# Patient Record
Sex: FEMALE | Race: WHITE | URBAN - METROPOLITAN AREA
[De-identification: names, ages, dates, MRNs, and addresses within clinical notes are randomized per-mention and may not be internally consistent; named-entity substitution may affect disease eponyms.]

---

## 2024-04-23 ENCOUNTER — OFFICE VISIT (OUTPATIENT)
Dept: INTERNAL MEDICINE CLINIC | Facility: CLINIC | Age: 34
End: 2024-04-23
Payer: COMMERCIAL

## 2024-04-23 VITALS
HEART RATE: 73 BPM | DIASTOLIC BLOOD PRESSURE: 71 MMHG | WEIGHT: 148 LBS | BODY MASS INDEX: 20.72 KG/M2 | HEIGHT: 71 IN | SYSTOLIC BLOOD PRESSURE: 107 MMHG | RESPIRATION RATE: 18 BRPM

## 2024-04-23 DIAGNOSIS — Z00.00 ANNUAL PHYSICAL EXAM: Primary | ICD-10-CM

## 2024-04-23 DIAGNOSIS — L98.9 SYMPTOMATIC LESION OF SKIN: ICD-10-CM

## 2024-04-23 DIAGNOSIS — E61.1 IRON DEFICIENCY: ICD-10-CM

## 2024-04-23 PROCEDURE — 99385 PREV VISIT NEW AGE 18-39: CPT | Performed by: INTERNAL MEDICINE

## 2024-04-23 PROCEDURE — 3078F DIAST BP <80 MM HG: CPT | Performed by: INTERNAL MEDICINE

## 2024-04-23 PROCEDURE — 3074F SYST BP LT 130 MM HG: CPT | Performed by: INTERNAL MEDICINE

## 2024-04-23 PROCEDURE — 3008F BODY MASS INDEX DOCD: CPT | Performed by: INTERNAL MEDICINE

## 2024-04-23 PROCEDURE — 96127 BRIEF EMOTIONAL/BEHAV ASSMT: CPT | Performed by: INTERNAL MEDICINE

## 2024-04-23 RX ORDER — LEVOTHYROXINE SODIUM 0.07 MG/1
TABLET ORAL
COMMUNITY
Start: 2024-01-29

## 2024-04-23 NOTE — PROGRESS NOTES
Subjective:     Patient ID: Caro Faria is a 34 year old female.  Presents for physical exam    HPI  Patient reports that she has been feeling tired, losing hair, She has skin tags and see inside the bellybutton, states it was more visible when she was pregnant and bellybutton was sticking out.  She had a baby girl 6 months ago, she is breast-feeding.  Does not get enough sleep.  She was diagnosed with hypothyroidism during pregnancy, last TSH level on March 30, 2024 was 4.12.  She has been taking levothyroxine 75 mcg daily.  Patient seen in the presence of her     Review of Systems       Constitutional:  Negative for decreased activity, fever, irritability and lethargy  Cardiovascular:  Negative for chest pain and irregular heartbeat/palpitations  Respiratory:  Negative for cough, dyspnea and wheezing.  Eyes:  Negative for eye discharge and vision loss  Endocrine:  Negative for polydipsia and polyphagia  Integumentary:  Negative for pruritus and rash  Neurological:  Negative for gait disturbance, paresthesias.   Psychiatric:  Negative for inappropriate interaction and psychiatric symptoms  Current Outpatient Medications   Medication Sig Dispense Refill    levothyroxine 75 MCG Oral Tab 1 tablet in the morning on an empty stomach Orally Once a day for 30 days       Allergies:No Known Allergies    History reviewed. No pertinent past medical history.   History reviewed. No pertinent surgical history.   History reviewed. No pertinent family history.   Social History:   Social History     Socioeconomic History    Marital status: Single   Tobacco Use    Smoking status: Never    Smokeless tobacco: Never   Vaping Use    Vaping status: Never Used   Substance and Sexual Activity    Alcohol use: Never    Drug use: Never        /71 (BP Location: Right arm, Patient Position: Sitting, Cuff Size: adult)   Pulse 73   Resp 18   Ht 5' 11\" (1.803 m)   Wt 148 lb (67.1 kg)   BMI 20.64 kg/m²    Physical  Exam  Constitutional:       Appearance: Normal appearance.   HENT:      Head: Normocephalic and atraumatic.      Right Ear: Tympanic membrane, ear canal and external ear normal.      Left Ear: Tympanic membrane, ear canal and external ear normal.   Eyes:      General: No scleral icterus.     Extraocular Movements: Extraocular movements intact.      Conjunctiva/sclera: Conjunctivae normal.      Pupils: Pupils are equal, round, and reactive to light.   Neck:      Vascular: No carotid bruit.   Cardiovascular:      Rate and Rhythm: Normal rate and regular rhythm.      Heart sounds: No murmur heard.     No gallop.   Pulmonary:      Effort: Pulmonary effort is normal.      Breath sounds: No wheezing or rhonchi.   Abdominal:      Palpations: Abdomen is soft. There is no mass.      Tenderness: There is no abdominal tenderness. There is no guarding or rebound.   Musculoskeletal:         General: Normal range of motion.      Cervical back: Normal range of motion and neck supple.      Right lower leg: No edema.      Left lower leg: No edema.   Lymphadenopathy:      Cervical: No cervical adenopathy.   Skin:     General: Skin is warm.      Coloration: Skin is not jaundiced.   Neurological:      General: No focal deficit present.      Mental Status: She is alert and oriented to person, place, and time. Mental status is at baseline.      Gait: Gait normal.   Psychiatric:         Mood and Affect: Mood normal.         Behavior: Behavior normal.         Thought Content: Thought content normal.         Assessment & Plan:   1. Annual physical exam continue healthy diet eat balanced meals, will check labs   2. Iron deficiency check iron studies continue prenatal vitamins   3. Symptomatic lesion of skin see dermatology   4.  Hypothyroidism acquired, will check TSH reflex      adjust medication as needed    Orders Placed This Encounter   Procedures    CBC With Differential With Platelet    Comp Metabolic Panel (14)    Assay, Thyroid Stim  Hormone    Free T4, (Free Thyroxine)    Iron And Tibc    Ferritin     Follow-up in 1 year or sooner if needed  Meds This Visit:  Requested Prescriptions      No prescriptions requested or ordered in this encounter       Imaging & Referrals:  [facility] - INTERNAL

## 2024-04-25 ENCOUNTER — OFFICE VISIT (OUTPATIENT)
Dept: DERMATOLOGY CLINIC | Facility: CLINIC | Age: 34
End: 2024-04-25

## 2024-04-25 DIAGNOSIS — D23.9 BENIGN NEOPLASM OF SKIN, UNSPECIFIED LOCATION: ICD-10-CM

## 2024-04-25 DIAGNOSIS — D49.2: Primary | ICD-10-CM

## 2024-04-25 PROCEDURE — 99203 OFFICE O/P NEW LOW 30 MIN: CPT | Performed by: DERMATOLOGY

## 2024-05-12 NOTE — PROGRESS NOTES
Caro Faria is a 34 year old female.    Chief Complaint   Patient presents with    Lesion     \"New Patient\" with referral for symptomatic lesion of skin, from Denisse Collins MD. Present for raised lesion on her abdomen, that she received after giving birth 1 year ago. Denies itching or pain. Denies personal or family Hx of skin cancer.             Patient has no known allergies.  Current Outpatient Medications   Medication Sig Dispense Refill    levothyroxine 75 MCG Oral Tab 1 tablet in the morning on an empty stomach Orally Once a day for 30 days        History reviewed. No pertinent past medical history.   Social History:  Social History     Socioeconomic History    Marital status: Single   Tobacco Use    Smoking status: Never    Smokeless tobacco: Never   Vaping Use    Vaping status: Never Used   Substance and Sexual Activity    Alcohol use: Never    Drug use: Never   Other Topics Concern    Reaction to local anesthetic No    Pt has a pacemaker No    Pt has a defibrillator No                 Current Outpatient Medications   Medication Sig Dispense Refill    levothyroxine 75 MCG Oral Tab 1 tablet in the morning on an empty stomach Orally Once a day for 30 days       Allergies:   No Known Allergies    History reviewed. No pertinent past medical history.  History reviewed. No pertinent surgical history.  Social History     Socioeconomic History    Marital status: Single     Spouse name: Not on file    Number of children: Not on file    Years of education: Not on file    Highest education level: Not on file   Occupational History    Not on file   Tobacco Use    Smoking status: Never    Smokeless tobacco: Never   Vaping Use    Vaping status: Never Used   Substance and Sexual Activity    Alcohol use: Never    Drug use: Never    Sexual activity: Not on file   Other Topics Concern    Grew up on a farm Not Asked    History of tanning Not Asked    Outdoor occupation Not Asked    Breast feeding Not Asked    Reaction to  local anesthetic No    Pt has a pacemaker No    Pt has a defibrillator No   Social History Narrative    Not on file     Social Determinants of Health     Financial Resource Strain: Not on file   Food Insecurity: Not on file   Transportation Needs: Not on file   Physical Activity: Not on file   Stress: Not on file   Social Connections: Not on file   Housing Stability: Not on file     History reviewed. No pertinent family history.                   HPI :      Chief Complaint   Patient presents with    Lesion     \"New Patient\" with referral for symptomatic lesion of skin, from Denisse Collins MD. Present for raised lesion on her abdomen, that she received after giving birth 1 year ago. Denies itching or pain. Denies personal or family Hx of skin cancer.     New patient with lesion at umbilicus.  Noticed this postpartum present about a year has not changed as far she is aware.  Asymptomatic.  Does not seem to be growing.    No personal  or family history of skin cancer    Patient with very fair skin careful with sun protection.  No history of tanning bed use.  Notes linea nigra had darkening with pregnancy as well fading.    No other lesions of concern.  Patient presents with concerns above.    Past notes/ records and appropriate/relevant lab results including pathology and past body maps reviewed. Updated and new information noted in current visit.       ROS:    Denies any other systemic complaints.  No fevers, chills, night sweats, sensitivity to the sun, deeper lumps or bumps.  No other skin complaints.  History, medications, allergies as noted.    Physical examination: Patient  well-developed well-nourished, alert oriented in no acute distress.  Exam of involved, appropriate areas of skin performed, including scalp, head, neck, face,nails, hair, external eyes, including conjunctival mucosa, eyelids, lips, external ears, back, chest, abdomen, arms, legs, palms.  Remarkable for lesions as noted   See map for  details  Inferior umbilicus with 2 mm darker brown papule uniform on dermoscopy.  Patient with fair skin phototype 1-2, few scattered benign appearing acquired nevi.  No other suspicious lesions  ASSESSMENT AND PLAN:     Encounter Diagnoses   Name Primary?    Neoplasm of skin of umbilicus Yes    Benign neoplasm of skin, unspecified location        Assessment / plan:    Benign-appearing nevus at umbilicus peer differential includes seborrheic keratosis.  Patient with fading linea nigra centrally.  Not clear how long lesion may have been present.  Borders regular color is uniform discussed possible biopsy in this location discussed possibility of recurrence, infection, abnormal scarring.  Decision made to monitor this recheck in 6 to 12 months.  If this does change follow-up sooner and plan biopsy.  Patient with fair skin encourage sun protection.  No other suspicious lesions over the head neck abdomen arms hands.  No other lesions of concern at this time continue sun protection.  Pathophysiology discussed with patient.  Therapeutic options reviewed.  See  Medications in grid.  Instructions reviewed at length.     General skin care questions answered.   Reassurance regarding benign skin lesions.Signs and symptoms of skin cancer, ABCDE's of melanoma briefly reviewed.  Sunscreen use (broad-spectrum, ideally mineral, UVA UVB coverage SPF 30 or greater recommended), sun protection, encouraged.  Followup as noted in rtc or p.r.n.    Encounter Times new patient  Including precharting, reviewing chart, prior notes obtaining history: 10 minutes, medical exam :10 minutes, notes on body map, plan, counseling 10minutes My total time spent caring for the patient on the day of the encounter: 30 minutes     The patient indicates understanding of these issues and agrees to the plan.  The patient is asked to return as noted in follow-up /as noted above    This note was generated using Dragon voice recognition software.  Please  contact me regarding any confusion resulting from errors in recognition.  Note to patient and family: The 21st Century Cures Act makes medical notes like these available to patients. However, be advised this is a medical document. It is intended as [id number]-[id number] communication and monitoring of a patient's care needs. It is written in medical language and may contain abbreviations or verbiage that are unfamiliar. It may appear blunt or direct. Medical documents are intended to carry relevant information, facts as evident and the clinical opinion of the practitioner.  No orders of the defined types were placed in this encounter.      Meds & Refills for this Visit:   Requested Prescriptions      No prescriptions requested or ordered in this encounter       Encounter Diagnoses   Name Primary?    Neoplasm of skin of umbilicus Yes    Benign neoplasm of skin, unspecified location        No orders of the defined types were placed in this encounter.      Results From Past 48 Hours:  No results found for this or any previous visit (from the past 48 hour(s)).    Meds This Visit:      Imaging Orders:  None     Referral Orders:  No orders of the defined types were placed in this encounter.

## 2024-06-02 LAB
% SATURATION: 44 % (CALC) (ref 16–45)
ALBUMIN/GLOBULIN RATIO: 2.2 (CALC) (ref 1–2.5)
ALBUMIN: 5 G/DL (ref 3.6–5.1)
ALKALINE PHOSPHATASE: 56 U/L (ref 31–125)
ALT: 11 U/L (ref 6–29)
AST: 11 U/L (ref 10–30)
BILIRUBIN, TOTAL: 0.7 MG/DL (ref 0.2–1.2)
BUN: 19 MG/DL (ref 7–25)
CALCIUM: 9.5 MG/DL (ref 8.6–10.2)
CARBON DIOXIDE: 32 MMOL/L (ref 20–32)
CHLORIDE: 105 MMOL/L (ref 98–110)
CREATININE: 0.88 MG/DL (ref 0.5–0.97)
EGFR: 88 ML/MIN/1.73M2
FERRITIN: 117 NG/ML (ref 16–154)
GLOBULIN: 2.3 G/DL (CALC) (ref 1.9–3.7)
GLUCOSE: 79 MG/DL (ref 65–99)
HEMATOCRIT: 40.5 % (ref 35–45)
HEMOGLOBIN: 13.2 G/DL (ref 11.7–15.5)
IRON BINDING CAPACITY: 315 MCG/DL (CALC) (ref 250–450)
IRON, TOTAL: 140 MCG/DL (ref 40–190)
MCH: 28.6 PG (ref 27–33)
MCHC: 32.6 G/DL (ref 32–36)
MCV: 87.9 FL (ref 80–100)
MPV: 9.9 FL (ref 7.5–12.5)
PLATELET COUNT: 293 THOUSAND/UL (ref 140–400)
POTASSIUM: 4.6 MMOL/L (ref 3.5–5.3)
PROTEIN, TOTAL: 7.3 G/DL (ref 6.1–8.1)
RDW: 13 % (ref 11–15)
RED BLOOD CELL COUNT: 4.61 MILLION/UL (ref 3.8–5.1)
SODIUM: 142 MMOL/L (ref 135–146)
T4, FREE: 1.2 NG/DL (ref 0.8–1.8)
TSH W/REFLEX TO FT4: 4.91 MIU/L
WHITE BLOOD CELL COUNT: 6.1 THOUSAND/UL (ref 3.8–10.8)

## 2024-06-07 ENCOUNTER — TELEPHONE (OUTPATIENT)
Dept: INTERNAL MEDICINE CLINIC | Facility: CLINIC | Age: 34
End: 2024-06-07

## 2024-06-08 ENCOUNTER — TELEPHONE (OUTPATIENT)
Dept: INTERNAL MEDICINE CLINIC | Facility: CLINIC | Age: 34
End: 2024-06-08

## 2024-06-08 NOTE — TELEPHONE ENCOUNTER
Left message for the patient that I am sending prescription for 100 mcg and she should repeat blood test in 3 months orders placed

## 2024-06-09 NOTE — TELEPHONE ENCOUNTER
From: Denisse Collins  To: Caro Faria  Sent: 6/8/2024 9:25 AM CDT  Subject: Test results    Nick Elizondo!  I tried to call you and you not available, I recommend that you take levothyroxine 100 mcg daily in will recheck blood test in 3 months. Feel free to give my office a call if you have questions. I placed order for repeat blood test for [facility]

## 2025-02-07 ENCOUNTER — TELEPHONE (OUTPATIENT)
Dept: INTERNAL MEDICINE CLINIC | Facility: CLINIC | Age: 35
End: 2025-02-07

## 2025-02-07 NOTE — TELEPHONE ENCOUNTER
Per spouse (on MARY) they are at [facility] now and no orders for TSH.  Order faxed to  [facility]. [phone]

## 2025-02-07 NOTE — TELEPHONE ENCOUNTER
[facility] received fax and called for the address and fax number of 's office. Information provided.

## 2025-02-08 LAB — TSH W/REFLEX TO FT4: 2.06 MIU/L

## 2025-02-19 ENCOUNTER — NURSE TRIAGE (OUTPATIENT)
Dept: INTERNAL MEDICINE CLINIC | Facility: CLINIC | Age: 35
End: 2025-02-19

## 2025-02-19 NOTE — TELEPHONE ENCOUNTER
[facility] health concerns[facility]    Patient sent a message on 2/18/25 noting the following in messgage    Also, my right breast has been hurting past couple of days, and has a slight discoloration, I was wondering if its something that I should be worried about. Thank you!     Best,  Caro    Left message with  [phone] to call back.

## 2025-02-19 NOTE — TELEPHONE ENCOUNTER
Action Requested: Summary for Provider     []  Critical Lab, Recommendations Needed  [] Need Additional Advice  []   FYI    []   Need Orders  [] Need Medications Sent to Pharmacy  []  Other     SUMMARY: Per protocol advised : Office visit within 3 days    Future Appointments   Date Time Provider Department Center   2/20/2025  4:40 PM Sas, Kathryn E., APRN [id number] [place]   4/2/2025  4:00 PM Kandel, Ninel, MD [id number] [place]       Reason for call: Acute  Onset: Data Unavailable       Patient calling ( name and date of birth of patient verified ) states having right breast pain,  nipple is discolored ,  fever for one night  but no fever today , slight redness noted but is better today than yesterday ;  she has been breast feeding  for 16 months ; onset of 3 days     Care Advice    Patient/Caregiver understands and will follow care advice?: Yes, plans to follow advice           Breast Symptoms-[facility]  Windy ECKERT Wed Feb 19, 2025 12:29 PM      Disposition and First Aid       SEE IN OFFICE WITHIN 3 DAYS:   * You need to be examined.   * Let me give you an appointment.              Breast Cancer Screening       CALL BACK IF:   * You feel a lump  * Nipple discharge occurs  * Change in appearance of breast  * You have more questions  * You become worse              Breast Tenderness and Pain with Menstrual Periods       WEAR A GOOD BRA:   * Wearing a good quality supportive bra is important.  * This is especially important whenever you are exercising or if you have moderate to large-sized breasts.  * Some women with large breasts are more comfortable wearing a bra even when sleeping.                           Patient verbalizes understanding and agrees with plan.         Reason for Disposition   Patient wants to be seen    Protocols used: Breast Symptoms-[facility]

## 2025-02-20 ENCOUNTER — OFFICE VISIT (OUTPATIENT)
Dept: INTERNAL MEDICINE CLINIC | Facility: CLINIC | Age: 35
End: 2025-02-20

## 2025-02-20 VITALS
HEART RATE: 65 BPM | DIASTOLIC BLOOD PRESSURE: 68 MMHG | WEIGHT: 135 LBS | SYSTOLIC BLOOD PRESSURE: 104 MMHG | HEIGHT: 71 IN | BODY MASS INDEX: 18.9 KG/M2

## 2025-02-20 DIAGNOSIS — N63.14 MASS OF LOWER INNER QUADRANT OF RIGHT BREAST: ICD-10-CM

## 2025-02-20 DIAGNOSIS — N64.4 BREAST PAIN, RIGHT: Primary | ICD-10-CM

## 2025-02-20 NOTE — PROGRESS NOTES
Caro Faria is a 35 year old female.  HPI:   Presents to clinic complaining of the right breast pain.  She reports she noticed it after working out.  Present for 4 days.  She reports it has significantly improved/resolved today.  Denies any skin changes, redness, nipple discharge.  Patient reports she is breast-feeding, baby is 16 months old.  Patient reports paternal aunt and cousin had breast cancer.  Denies any chest pain, shortness of breath appetite or weight changes.  Current Outpatient Medications   Medication Sig Dispense Refill    levothyroxine 100 MCG Oral Tab Take 1 tablet (100 mcg total) by mouth daily. Discontinue levothyroxine 75 mcg 90 tablet 1    levothyroxine 75 MCG Oral Tab 1 tablet in the morning on an empty stomach Orally Once a day for 30 days        History reviewed. No pertinent past medical history.   Social History:  Social History     Socioeconomic History    Marital status: Single   Tobacco Use    Smoking status: Never     Passive exposure: Never    Smokeless tobacco: Never   Vaping Use    Vaping status: Never Used   Substance and Sexual Activity    Alcohol use: Never    Drug use: Never   Other Topics Concern    Reaction to local anesthetic No    Pt has a pacemaker No    Pt has a defibrillator No        REVIEW OF SYSTEMS:   Review of Systems   Constitutional:  Negative for activity change, appetite change, chills, diaphoresis, fatigue, fever and unexpected weight change.   HENT:  Negative for congestion.    Eyes:  Negative for visual disturbance.   Respiratory:  Negative for cough, chest tightness, shortness of breath and wheezing.    Cardiovascular:  Negative for chest pain, palpitations and leg swelling.   Gastrointestinal:  Negative for abdominal pain, constipation, diarrhea, nausea and vomiting.   Endocrine: Negative.    Genitourinary:  Negative for difficulty urinating and vaginal bleeding.   Musculoskeletal:  Negative for arthralgias and back pain.   Skin: Negative.     Neurological:  Negative for dizziness, seizures, numbness and headaches.   Hematological: Negative.    Psychiatric/Behavioral: Negative.            EXAM:   /68 (BP Location: Right arm, Patient Position: Sitting, Cuff Size: adult)   Pulse 65   Ht 5' 11\" (1.803 m)   Wt 135 lb (61.2 kg)   LMP  (LMP Unknown)   BMI 18.83 kg/m²     Physical Exam  Vitals reviewed. Chaperone present: Declined.   Constitutional:       General: She is not in acute distress.     Appearance: Normal appearance.   HENT:      Head: Normocephalic.   Eyes:      General: No scleral icterus.     Conjunctiva/sclera: Conjunctivae normal.   Cardiovascular:      Pulses: Normal pulses.      Heart sounds: Normal heart sounds.   Pulmonary:      Effort: Pulmonary effort is normal.      Breath sounds: Normal breath sounds.   Chest:      Chest wall: No mass.   Breasts:     Right: Mass and tenderness present. No bleeding, inverted nipple, nipple discharge or skin change.      Left: No swelling, bleeding, inverted nipple, mass, nipple discharge, skin change or tenderness.          Comments: Less than 0.5 cm ovoid mass, firm but mobile noted in the right  Inferior medial breast.  No nipple discharge  Lymphadenopathy:      Upper Body:      Right upper body: No supraclavicular, axillary or pectoral adenopathy.      Left upper body: No supraclavicular, axillary or pectoral adenopathy.   Skin:     General: Skin is warm.      Coloration: Skin is not jaundiced or pale.      Findings: No bruising or erythema.   Neurological:      Mental Status: She is alert and oriented to person, place, and time.   Psychiatric:         Thought Content: Thought content normal.         Judgment: Judgment normal.            ASSESSMENT AND PLAN:     Assessment & Plan  Breast pain, right  We will proceed with imaging  Tylenol PRN  Orders:    [facility] 2D+3D DIAGNOSTIC [facility]  BILAT (CPT=77066/25898); Future    US BREAST BILATERAL LTD (CPT=76642-50); Future    Mass of lower inner  quadrant of right breast  Will proceed with imaging  Orders:    [facility] HARESH 2D+3D DIAGNOSTIC [facility]  BILAT (CPT=77066/73012); Future     BREAST BILATERAL LTD (CPT=76642-50); Future         The patient indicates understanding of these issues and agrees to the plan.  The patient is asked to return in 2 weeks.     The above note was creating using Dragon speech recognition technology. Please excuse any typos.

## 2025-02-20 NOTE — ASSESSMENT & PLAN NOTE
We will proceed with imaging  Tylenol PRN  Orders:    [facility] HARESH 2D+3D DIAGNOSTIC [facility]  BILAT (CPT=77066/73755); Future     BREAST BILATERAL LTD (CPT=76642-50); Future

## 2025-02-20 NOTE — ASSESSMENT & PLAN NOTE
Will proceed with imaging  Orders:    [facility] HARESH 2D+3D DIAGNOSTIC [place]  BILAT (CPT=77066/69866); Future    US BREAST BILATERAL LTD (CPT=76642-50); Future